# Patient Record
Sex: FEMALE | Race: WHITE | NOT HISPANIC OR LATINO | Employment: OTHER | URBAN - METROPOLITAN AREA
[De-identification: names, ages, dates, MRNs, and addresses within clinical notes are randomized per-mention and may not be internally consistent; named-entity substitution may affect disease eponyms.]

---

## 2021-03-19 ENCOUNTER — APPOINTMENT (OUTPATIENT)
Dept: RADIOLOGY | Facility: CLINIC | Age: 71
End: 2021-03-19
Payer: MEDICARE

## 2021-03-19 DIAGNOSIS — Z87.09 HISTORY OF CHRONIC COUGH: ICD-10-CM

## 2021-03-19 PROCEDURE — 71046 X-RAY EXAM CHEST 2 VIEWS: CPT

## 2021-03-29 ENCOUNTER — OFFICE VISIT (OUTPATIENT)
Dept: PULMONOLOGY | Facility: CLINIC | Age: 71
End: 2021-03-29
Payer: MEDICARE

## 2021-03-29 VITALS
RESPIRATION RATE: 18 BRPM | TEMPERATURE: 97.9 F | HEART RATE: 91 BPM | BODY MASS INDEX: 38.11 KG/M2 | WEIGHT: 242.8 LBS | SYSTOLIC BLOOD PRESSURE: 142 MMHG | OXYGEN SATURATION: 97 % | HEIGHT: 67 IN | DIASTOLIC BLOOD PRESSURE: 82 MMHG

## 2021-03-29 DIAGNOSIS — J45.909 UNCOMPLICATED ASTHMA, UNSPECIFIED ASTHMA SEVERITY, UNSPECIFIED WHETHER PERSISTENT: Primary | ICD-10-CM

## 2021-03-29 PROCEDURE — 99205 OFFICE O/P NEW HI 60 MIN: CPT | Performed by: INTERNAL MEDICINE

## 2021-03-29 RX ORDER — PREGABALIN 75 MG/1
CAPSULE ORAL
COMMUNITY
Start: 2021-03-26

## 2021-03-29 RX ORDER — MONTELUKAST SODIUM 10 MG/1
10 TABLET ORAL EVERY EVENING
COMMUNITY
Start: 2021-03-24

## 2021-03-29 RX ORDER — FERROUS GLUCONATE 256(28)MG
TABLET ORAL
COMMUNITY

## 2021-03-29 RX ORDER — CETIRIZINE HYDROCHLORIDE, PSEUDOEPHEDRINE HYDROCHLORIDE 5; 120 MG/1; MG/1
1 TABLET, FILM COATED, EXTENDED RELEASE ORAL 2 TIMES DAILY
COMMUNITY

## 2021-03-29 RX ORDER — DIPHENOXYLATE HYDROCHLORIDE AND ATROPINE SULFATE 2.5; .025 MG/1; MG/1
1 TABLET ORAL
COMMUNITY

## 2021-03-29 RX ORDER — MELOXICAM 7.5 MG/1
TABLET ORAL
COMMUNITY

## 2021-03-29 RX ORDER — GABAPENTIN 300 MG/1
300 CAPSULE ORAL EVERY 8 HOURS
COMMUNITY
Start: 2021-03-19

## 2021-03-29 RX ORDER — MONTELUKAST SODIUM 10 MG/1
10 TABLET ORAL
COMMUNITY
Start: 2021-03-24 | End: 2022-03-24

## 2021-03-29 RX ORDER — BUDESONIDE AND FORMOTEROL FUMARATE DIHYDRATE 160; 4.5 UG/1; UG/1
2 AEROSOL RESPIRATORY (INHALATION) 2 TIMES DAILY
COMMUNITY
Start: 2021-03-24 | End: 2021-05-24

## 2021-03-29 NOTE — PROGRESS NOTES
Pulmonary Consultation   Gustavo Cooney 73 y o  female MRN: [id number]  3/29/2021      Assessment:  Chronic cough/wheezing   · Likely related to reversible airflow limitation, possibly new onset asthma vs secondary to adalimumab therapy  · Also noted significant NSAID use, on ibuprofen 600 milligram q 12 hours for knee pain   · Noted significant symptomatic improvement with ICS/laba, Singulair, p r n  albuterol ordered by ENT    Plan:   · Continue current therapy with Symbicort 160, p r n  albuterol  · Continue Singulair  · Will check complete pulmonary function test with bronchodilator response  · Given the history of rheumatoid arthritis, will check for signs of restrictive lung disease  · Reviewed the proper inhaler use techniques, instructed to rinse her mouth following each use  · Counseled about limiting NSAID use, given the risk for exacerbating asthma/cough symptoms and side effect of gastric/kidney disease      Return in about 2 months (around 5/29/2021)  History of Present Illness   61-year-old female with a history of hypothyroidism, rheumatoid arthritis, allergic rhinitis presented for evaluation by Pulmonary for chronic cough  She states that symptoms started approximately 1 5 years ago when she was switching treatment for rheumatoid arthritis  Noted a dry cough, comes in spells and sometimes with dry heaves and maybe vomiting after the  Also noted associations with wheezing  Tried over-the-counter preparation for cough without significant improvement  One week ago, she was evaluated by ENT, noted to have significant wheezing on lung exam   She was started on Singulair, Symbicort 160 and p r n  albuterol  She noted significant improvement in the wheezing and cough since started all of them denies associated dyspnea, sputum production, dizziness, syncope or presyncope  Denies any change in the environment at home or new exposure    States [place], has rabbits, dogs and cats in addition to horses thought nothing has been due reports intermittent nocturnal symptoms of cough and wheezing  Positive family history for asthma in her sister and aunt      Chest x-ray 03/19/2021-clear lung fields, no acute process    Review of Systems  As per hpi, all other systems reviewed and were negative  Historical Information   Past Medical History:   Diagnosis Date    Allergic rhinitis     Anxiety     Disease of thyroid gland     Rheumatoid arthritis ([address] )     Spinal stenosis      Past Surgical History:   Procedure Laterality Date    ARM SKIN LESION BIOPSY / EXCISION      granulomas    KNEE ARTHROSCOPY       Family History   Problem Relation Age of Onset    No Known Problems Mother     Multiple myeloma Father     Ovarian cancer Maternal Grandmother     Heart disease Paternal Grandmother     Emphysema Paternal Grandfather          Medications/Allergies: Reviewed    Vitals: Blood pressure 142/82, pulse 91, temperature 97 9 °F (36 6 °C), resp  rate 18, height 5' 7" (1 702 m), weight 110 kg (242 lb 12 8 oz), SpO2 97 %  Body mass index is 38 03 kg/m²  Oxygen Therapy  SpO2: 97 %  Oxygen Therapy: None (Room air)      Physical Exam  Body mass index is 38 03 kg/m²  Gen: not in acute distress, morbidly obese  HEENT: supple, no adenopathy, PERRLA  Chest: normal respiratory efforts, scattered fine wheezes, otherwise clear breath sounds bilaterally  CV: RRR, no murmurs appreciated  Abdomen: soft, non tender, normal bowel sounds  Extremities: no edema  Skin: unremarkable  Neuro: AAO X3, no focal motor deficit      Labs:  Reviewed    Imaging and other studies: I have personally reviewed pertinent films in PACS      Pulmonary function testing:       EKG, Pathology, and Other Studies: I have personally reviewed pertinent reports  Portions of the record may have been created with voice recognition software    Occasional wrong word or "sound a like" substitutions may have occurred due to the inherent limitations of voice recognition software  Read the chart carefully and recognize, using context, where substitutions have occurred    [facility]

## 2021-04-06 ENCOUNTER — HOSPITAL ENCOUNTER (OUTPATIENT)
Dept: PULMONOLOGY | Facility: HOSPITAL | Age: 71
Discharge: HOME | End: 2021-04-06
Attending: INTERNAL MEDICINE
Payer: MEDICARE

## 2021-04-06 DIAGNOSIS — J45.909 UNCOMPLICATED ASTHMA, UNSPECIFIED ASTHMA SEVERITY, UNSPECIFIED WHETHER PERSISTENT: ICD-10-CM

## 2021-04-06 PROCEDURE — 94760 N-INVAS EAR/PLS OXIMETRY 1: CPT

## 2021-04-06 PROCEDURE — 94726 PLETHYSMOGRAPHY LUNG VOLUMES: CPT | Performed by: INTERNAL MEDICINE

## 2021-04-06 PROCEDURE — 94729 DIFFUSING CAPACITY: CPT

## 2021-04-06 PROCEDURE — 94729 DIFFUSING CAPACITY: CPT | Performed by: INTERNAL MEDICINE

## 2021-04-06 PROCEDURE — 94060 EVALUATION OF WHEEZING: CPT

## 2021-04-06 PROCEDURE — 94726 PLETHYSMOGRAPHY LUNG VOLUMES: CPT

## 2021-04-06 PROCEDURE — 94060 EVALUATION OF WHEEZING: CPT | Performed by: INTERNAL MEDICINE

## 2021-04-06 RX ORDER — ALBUTEROL SULFATE 2.5 MG/3ML
2.5 SOLUTION RESPIRATORY (INHALATION) ONCE AS NEEDED
Status: COMPLETED | OUTPATIENT
Start: 2021-04-06 | End: 2021-04-06

## 2021-04-06 RX ADMIN — ALBUTEROL SULFATE 2.5 MG: 2.5 SOLUTION RESPIRATORY (INHALATION) at 13:57

## 2021-05-24 ENCOUNTER — OFFICE VISIT (OUTPATIENT)
Dept: PULMONOLOGY | Facility: CLINIC | Age: 71
End: 2021-05-24
Payer: MEDICARE

## 2021-05-24 VITALS
OXYGEN SATURATION: 98 % | DIASTOLIC BLOOD PRESSURE: 92 MMHG | BODY MASS INDEX: 38.64 KG/M2 | HEIGHT: 67 IN | WEIGHT: 246.2 LBS | HEART RATE: 88 BPM | SYSTOLIC BLOOD PRESSURE: 176 MMHG | RESPIRATION RATE: 18 BRPM | TEMPERATURE: 97.7 F

## 2021-05-24 DIAGNOSIS — J45.909 UNCOMPLICATED ASTHMA, UNSPECIFIED ASTHMA SEVERITY, UNSPECIFIED WHETHER PERSISTENT: Primary | ICD-10-CM

## 2021-05-24 DIAGNOSIS — R06.83 SNORING: ICD-10-CM

## 2021-05-24 PROCEDURE — 99214 OFFICE O/P EST MOD 30 MIN: CPT | Performed by: INTERNAL MEDICINE

## 2021-05-24 RX ORDER — ALPRAZOLAM 0.5 MG/1
TABLET ORAL
COMMUNITY
Start: 2021-04-07

## 2021-05-24 RX ORDER — LANOLIN ALCOHOL/MO/W.PET/CERES
3 CREAM (GRAM) TOPICAL
COMMUNITY

## 2021-05-24 NOTE — PROGRESS NOTES
Pulmonary Follow Up Note   Mike Ng 73 y o  female MRN: [id number]  5/24/2021    Assessment:  Mild intermittent asthma  · Likely the etiology of recurrent cough/wheezing, also in the settings of seasonal allergies, NSAID use  · PFT showed no significant response to bronchodilator, likely lack of exposure/ics effect    Plan:   · Continue current therapy with Advair 250 q  12 hours  · Continue Singulair 10 mg daily   · Plan to step-down on treatment when approaching the fall  · Advised to limit NSAID use and avoid exposure to the allergens    Positive BARTOLO screen   · Reports that  said that she has periods of stopping breathing, she also has HTN, morbid obesity and feels morning fatigue   · Declined referral to Sleep Medicine because she does not like the CPAP mask      Morbid obesity  · Counseled    Return in about 6 months (around 11/24/2021)  History of Present Illness   Background   81-year-old female with a history of hypothyroidism, rheumatoid arthritis, allergic rhinitis who was initially seen by Pulmonary in 03/2021 for persistent chronic cough  Noted about 1 5 years ago when switching treatment for rheumatoid arthritis, noted associations with wheezing  Symptoms started to improve after started Singulair, Symbicort 160 and albuterol by ENT team     PFT 03/20/2021-ratio 82%, FEV1 2 03 L/82%, FVC 2 47 L/77%, TLC 81%, RV 76%, DLCO 57% no significant response to bronchodilators    Interval history  Continues to feel better  Cough is almost gone, no wheezing or dyspnea on exertion  She switched from Symbicort to Advair 250  Continues to use it 80  Does not use p r n  albuterol at all  No nocturnal symptoms states that his allergic rhinitis symptoms is better controlled now usually worse in the [place]/spring time continues to use NSAIDs 400 mg ibuprofen q 12 hours, currently on Enbrel for rheumatoid arthritis      Review of Systems  As per hpi, all other systems reviewed and were negative    Past medical, surgical, social and family histories reviewed  Medications/Allergies: Reviewed      Vitals: Blood pressure (!) 176/92, pulse 88, temperature 97 7 °F (36 5 °C), resp  rate 18, height 5' 7" (1 702 m), weight 112 kg (246 lb 3 2 oz), SpO2 98 %  Body mass index is 38 56 kg/m²  Oxygen Therapy  SpO2: 98 %  Oxygen Therapy: None (Room air)      Physical Exam  Body mass index is 38 56 kg/m²  Gen: not in acute distress, obese  HEENT: supple, no adenopathy, PERRLA  Chest: normal respiratory efforts, clear breath sounds bilaterally  CV: RRR, no murmurs appreciated  Abdomen: soft, non tender, normal bowel sounds  Extremities: no edema  Skin: unremarkable  Neuro: AAO X3, no focal motor deficit      Labs:  No results found for: WBC, HGB, HCT, MCV, PLT  No results found for: GLUCOSE, CALCIUM, NA, K, CO2, CL, BUN, CREATININE  No results found for: IGE  No results found for: ALT, AST, GGT, ALKPHOS, BILITOT      Imaging and other studies: I have personally reviewed pertinent films in PACS      Pulmonary function testing:       EKG, Pathology, and Other Studies: I have personally reviewed pertinent reports  Portions of the record may have been created with voice recognition software  Occasional wrong word or "sound a like" substitutions may have occurred due to the inherent limitations of voice recognition software  Read the chart carefully and recognize, using context, where substitutions have occurred    [facility]

## 2021-12-02 PROBLEM — H65.21 CHRONIC SEROUS OTITIS MEDIA OF RIGHT EAR: Status: ACTIVE | Noted: 2021-12-02

## 2021-12-02 PROBLEM — H69.81 DYSFUNCTION OF RIGHT EUSTACHIAN TUBE: Status: ACTIVE | Noted: 2021-12-02

## 2022-03-15 ENCOUNTER — HOSPITAL ENCOUNTER (OUTPATIENT)
Dept: BONE DENSITY | Facility: HOSPITAL | Age: 72
Discharge: HOME | End: 2022-03-15
Payer: MEDICARE

## 2022-03-15 DIAGNOSIS — Z79.899 ENCOUNTER FOR LONG-TERM (CURRENT) USE OF OTHER MEDICATIONS: ICD-10-CM

## 2022-03-15 PROCEDURE — 77080 DXA BONE DENSITY AXIAL: CPT
